# Patient Record
Sex: FEMALE | Race: OTHER | HISPANIC OR LATINO | Employment: STUDENT | URBAN - METROPOLITAN AREA
[De-identification: names, ages, dates, MRNs, and addresses within clinical notes are randomized per-mention and may not be internally consistent; named-entity substitution may affect disease eponyms.]

---

## 2024-01-19 ENCOUNTER — OFFICE VISIT (OUTPATIENT)
Dept: URGENT CARE | Facility: CLINIC | Age: 24
End: 2024-01-19
Payer: COMMERCIAL

## 2024-01-19 VITALS
OXYGEN SATURATION: 99 % | HEIGHT: 62 IN | BODY MASS INDEX: 30.02 KG/M2 | WEIGHT: 163.13 LBS | DIASTOLIC BLOOD PRESSURE: 79 MMHG | RESPIRATION RATE: 19 BRPM | SYSTOLIC BLOOD PRESSURE: 112 MMHG | HEART RATE: 76 BPM | TEMPERATURE: 98 F

## 2024-01-19 DIAGNOSIS — J01.00 ACUTE NON-RECURRENT MAXILLARY SINUSITIS: Primary | ICD-10-CM

## 2024-01-19 DIAGNOSIS — R05.9 COUGH, UNSPECIFIED TYPE: ICD-10-CM

## 2024-01-19 PROCEDURE — 99213 OFFICE O/P EST LOW 20 MIN: CPT | Mod: S$GLB,,, | Performed by: NURSE PRACTITIONER

## 2024-01-19 RX ORDER — AMOXICILLIN AND CLAVULANATE POTASSIUM 875; 125 MG/1; MG/1
1 TABLET, FILM COATED ORAL EVERY 12 HOURS
Qty: 20 TABLET | Refills: 0 | Status: SHIPPED | OUTPATIENT
Start: 2024-01-19 | End: 2024-01-29

## 2024-01-19 NOTE — PROGRESS NOTES
"Subjective:      Patient ID: Laury Esparza is a 23 y.o. female.    Vitals:  height is 5' 2" (1.575 m) and weight is 74 kg (163 lb 2.3 oz). Her oral temperature is 98.3 °F (36.8 °C). Her blood pressure is 112/79 and her pulse is 76. Her respiration is 19 and oxygen saturation is 99%.     Chief Complaint: Cough    Pt presents  cough, chest pain and congestion that started new years tawanna. Coughing up greens mucous. Pt states she has been taking Theraflu and tylenol.     22 yo woman, c/o sinus congestion and cough since 1/31/24.  She denies any fevers or chills since New Years Tawanna.  Reports a lot of yellow and green mucus and a dry throat.  Reports facial pain around the nose and pain the chest centrally when she is coughing.  CP feels like a muscle strain.  The cough is productive.  It does not keep the patient up at night. Denies sob or wheezing.  Denies any h/o seasonal allergies or asthma. No tobacco use.   Home Covid test was negative one week ago.  Patient had mono last year.     Cough  This is a new problem. The current episode started 1 to 4 weeks ago. The problem has been unchanged. The problem occurs every few minutes. Associated symptoms include chest pain, nasal congestion, postnasal drip and rhinorrhea. Pertinent negatives include no fever or headaches. She has tried OTC cough suppressant for the symptoms. The treatment provided no relief.       Constitution: Negative for fever.   HENT:  Positive for postnasal drip.    Cardiovascular:  Positive for chest pain.   Respiratory:  Positive for cough.    Neurological:  Negative for headaches.      Objective:     Physical Exam   Constitutional: She is oriented to person, place, and time. She appears well-developed. She is cooperative.  Non-toxic appearance. She does not appear ill. No distress.   HENT:   Head: Normocephalic and atraumatic.   Ears:   Right Ear: Hearing, tympanic membrane, external ear and ear canal normal.   Left Ear: Hearing, tympanic " membrane, external ear and ear canal normal.   Nose: Mucosal edema and rhinorrhea present. No nasal deformity. No epistaxis. Right sinus exhibits maxillary sinus tenderness. Right sinus exhibits no frontal sinus tenderness. Left sinus exhibits maxillary sinus tenderness. Left sinus exhibits no frontal sinus tenderness.   Mouth/Throat: Uvula is midline, oropharynx is clear and moist and mucous membranes are normal. Mucous membranes are moist. No trismus in the jaw. Normal dentition. No uvula swelling. No oropharyngeal exudate, posterior oropharyngeal edema or posterior oropharyngeal erythema. Oropharynx is clear.      Comments: No adenopathy  Pt reported pain in the Maxillary sinuses bilaterally when she bent forward.       Comments: Pt reported pain in the Maxillary sinuses bilaterally when she bent forward.   Eyes: Conjunctivae and lids are normal. No scleral icterus.   Neck: Trachea normal and phonation normal. Neck supple. No edema present. No erythema present. No neck rigidity present.   Cardiovascular: Normal rate, regular rhythm, normal heart sounds and normal pulses.   Pulmonary/Chest: Effort normal and breath sounds normal. No respiratory distress. She has no decreased breath sounds. She has no wheezes. She has no rhonchi. She has no rales. She exhibits no tenderness.   Abdominal: Normal appearance.   Musculoskeletal: Normal range of motion.         General: No deformity. Normal range of motion.   Neurological: She is alert and oriented to person, place, and time. She exhibits normal muscle tone. Coordination normal.   Skin: Skin is warm, dry, intact, not diaphoretic and not pale.   Psychiatric: Her speech is normal and behavior is normal. Judgment and thought content normal.   Nursing note and vitals reviewed.      Assessment:     1. Acute non-recurrent maxillary sinusitis    2. Cough, unspecified type        Plan:   Start antibiotics and decongestants.  If not better in 3-4 days, advised to go to urgent  care for a chest X-ray.  Start Zyrtec daily. Return to clinic as needed.    Acute non-recurrent maxillary sinusitis  -     amoxicillin-clavulanate 875-125mg (AUGMENTIN) 875-125 mg per tablet; Take 1 tablet by mouth every 12 (twelve) hours. for 10 days  Dispense: 20 tablet; Refill: 0    Cough, unspecified type  -     amoxicillin-clavulanate 875-125mg (AUGMENTIN) 875-125 mg per tablet; Take 1 tablet by mouth every 12 (twelve) hours. for 10 days  Dispense: 20 tablet; Refill: 0

## 2024-01-19 NOTE — PATIENT INSTRUCTIONS
Start taking Zyrtec every night.  Use nasal saline nasal spray for your congestion and Sudafed.  If you are not better in 3-4 days, go to urgent care for a chest x-ray.  Return to clinic as needed.     Bacterial Sinusitis:    Unlike Viral Sinusitis (or viral URI), Bacterial sinusitis is treated with antibiotic medicine. This should help you start to feel better in a few days.  Take antibiotic medicine for the full 10 days, even if you feel better. This is very important to ensure the infection is treated. It is also important to prevent medicine-resistant germs from developing.     - Rest at home.     - Drink plenty of fluids so you won't get dehydrated.    - Sinus or Chest Congestion recommendations:  - you can take plain Mucinex (guaifenesin) twice a day (or as directed) to help loosen mucus in your sinuses or your chest.     - you can take Sudafed (Pseudoephedrine) for sinus congestion every 4-6 hours as needed.  You have to sign for it at the pharmacy counter with your 's license or a passport.  Do not take Sudafed if you have elevated blood pressure.  Instead, use Coricidin HBP.    - you can use Normal Saline nasal spray as needed for sinus congestion.  It is available over the counter.      - Fever/Pain recommendations:       - Tylenol or Ibuprofen as directed as needed for fever/pain.    Take ibuprofen 600-800 mg every 6-8 hours for pain and inflammation.  Do not take ibuprofen if you have a history of GI bleeding, kidney disease, or if you take blood thinners.    You can also take Tylenol/acetaminophen 650-1000 mg every 6-8 hours for added pain relief. Avoid tylenol if you have a history of liver disease.       - Cough recommendations:      Dextromethorphan (DM) is a cough suppressant over the counter (ie. mucinex DM, robitussin, delsym; dayquil/nyquil has DM as well.).      Warm tea with honey can help with cough. Honey is a natural cough suppressant.    -Sore throat recommendations: Warm fluids, warm  salt water gargles, throat lozenges, tea, honey, soup, or drinking something cold or frozen.  Throat lozenges or sprays help reduce pain. Gargling with warm saltwater (1/4 teaspoon of salt in 1/2 cup of warm water) or an OTC anesthetic gargle may be useful for irritation.    - Follow up with your PCP or specialty clinic as directed in the next 1-2 weeks if not improved or as needed.  You can call [phone] to schedule an appointment with the appropriate provider.      - If your condition worsens or fails to improve we recommend that you receive another evaluation at the ER immediately or contact your PCP to discuss your concerns or return here.    When to seek medical advice  Call your healthcare provider right away if any of these occur:  Fever that is poorly controlled with OTC fever reducing medication  New or worsening ear pain, sinus pain, or headache  Stiff neck  You can't swallow liquids or you can't open your mouth wide because of throat pain  Signs of dehydration. These include very dark urine or no urine, sunken eyes, and dizziness.  Trouble breathing or noisy breathing  Muffled voice  Rash